# Patient Record
Sex: MALE | Race: WHITE | NOT HISPANIC OR LATINO | Employment: UNEMPLOYED | URBAN - METROPOLITAN AREA
[De-identification: names, ages, dates, MRNs, and addresses within clinical notes are randomized per-mention and may not be internally consistent; named-entity substitution may affect disease eponyms.]

---

## 2020-12-17 ENCOUNTER — HISTORICAL (OUTPATIENT)
Dept: ADMINISTRATIVE | Facility: HOSPITAL | Age: 30
End: 2020-12-17

## 2020-12-23 ENCOUNTER — HISTORICAL (OUTPATIENT)
Dept: ADMINISTRATIVE | Facility: HOSPITAL | Age: 30
End: 2020-12-23

## 2021-01-26 ENCOUNTER — HISTORICAL (OUTPATIENT)
Dept: ADMINISTRATIVE | Facility: HOSPITAL | Age: 31
End: 2021-01-26

## 2021-05-25 ENCOUNTER — HISTORICAL (OUTPATIENT)
Dept: ADMINISTRATIVE | Facility: HOSPITAL | Age: 31
End: 2021-05-25

## 2021-06-21 ENCOUNTER — HISTORICAL (OUTPATIENT)
Dept: ADMINISTRATIVE | Facility: HOSPITAL | Age: 31
End: 2021-06-21

## 2022-04-09 ENCOUNTER — HISTORICAL (OUTPATIENT)
Dept: ADMINISTRATIVE | Facility: HOSPITAL | Age: 32
End: 2022-04-09

## 2022-04-25 VITALS
BODY MASS INDEX: 35.2 KG/M2 | WEIGHT: 274.25 LBS | OXYGEN SATURATION: 96 % | SYSTOLIC BLOOD PRESSURE: 130 MMHG | DIASTOLIC BLOOD PRESSURE: 86 MMHG | HEIGHT: 74 IN

## 2022-05-02 NOTE — HISTORICAL [facility]
This is a historical note converted from Arnav. Formatting and pictures may have been removed.  Please reference Arnav for original formatting and attached multimedia. Chief Complaint  ORIF L acetabulum, posterior pelvic ring fx sx 11/28/20... pt states he is having alot of pain, no other changes, xr today  History of Present Illness  Patient comes in today follow-up for his left?pelvic fracture and for wound check.  Patient?and his caretaker?say they have been doing wound care but they have been putting antibiotic ointment on it and has not been totally Dry.  They state the redness has improved?but there is then?still an area of exudate in the wound is mildly?dehisced as as of last week.  ?  Patient denies any fevers?or purulent drainage today  Review of Systems  Constitutional: No fever, No chills.  Respiratory: No shortness of breath, No cough.  Cardiovascular: No chest pain.  Gastrointestinal: No nausea, No vomiting, No diarrhea, No constipation, No heartburn.  Genitourinary: No dysuria, No hematuria.  Hematology/Lymphatics: No bleeding tendency.  Endocrine: No polyuria.  Neurologic: Alert and oriented X4, No numbness, No tingling.  Psychiatric: No anxiety, No depression.  Integumentary: ?negative except as documented in history of present illness  Physical Exam  Vitals & Measurements  T:?36.4? ?C (Oral)? HR:?104(Peripheral)? BP:?136/88?  HT:?188.00?cm? WT:?108.000?kg? BMI:?30.56?  Patients left?pelvic?incision area does have area of dehiscence but there is very minimal to no erythema around the wound edges.  There is some fibrinous and mild exudate?on the more midline aspect of it.  The?more lateral aspect has?dried up?eschar.  No purulent drainage  ?  Patient has repeat pelvic x-rays today  No acute changes noted in the patients positioning or hardware?of his left pelvic fracture  ?  At this point will continue patient antibiotics for another week.  He is going to use some?peroxide?once daily to cleanse the  wound and apply dry dressing to it there afterwards. ?He is not to use any ointments or other types of?skin moistening agents. follow-up next week for repeat evaluation of his wound  I?will?discuss this with Dr. Weiss and if he wants to proceed in a different manner?we will contact the patient  Assessment/Plan  1.?Closed displaced transverse fracture of left acetabulum?S32.031Z  2.?Multiple fractures of pelvis with unstable disruption of pelvic [place]?S32.812F  Orders:  sulfamethoxazole-trimethoprim, 1 tab(s), Oral, BID, X 7 day(s), # 14 tab(s), 0 Refill(s), Pharmacy: [facility]/pharmacy #[id number], 188, cm, Height/Length Dosing, 12/23/20 13:17:00 CST, 108, kg, Weight Dosing, 12/23/20 13:17:00 CST   Problem List/Past Medical History  Ongoing  Obesity  Historical  HA - Headache  Tobacco abuse  Procedure/Surgical History  Incision and Drainage Hip (Left) (11/28/2020)  Removal of Drainage Device from Lower Extremity Subcutaneous Tissue and Fascia, Open Approach (11/28/2020)  ORIF Acetabulum/Pelvis (Left) (11/23/2020)  Reposition Left Acetabulum with Internal Fixation Device, Open Approach (11/23/2020)  Reposition Left Pelvic Bone with Internal Fixation Device, Open Approach (11/23/2020)  Denies   Medications  Bactrim  mg-160 mg oral tablet, 1 tab(s), Oral, BID  Enoxaparin 30mg Inj, 30 mg, Subcutaneous, q12hr  methocarbamol 750 mg oral tablet, 750 mg= 1 tab(s), Oral, TID  Neurontin 300 mg oral capsule, 300 mg= 1 cap(s), Oral, TID  Norco 10 mg-325 mg oral tablet, 1 tab(s), Oral, q6hr, PRN,? ?Not taking: Last Dose Date/Time Unknown  Allergies  No Known Medication Allergies  Social History  Abuse/Neglect  No, 12/23/2020  Alcohol - Denies Alcohol Use, 11/14/2014  Current, Beer, Daily, Alcohol use interferes with work or home: No. Others hurt by drinking: No. Household alcohol concerns: No., 11/22/2020  Home/Environment  Lives with INCARCERATED. Alcohol abuse in household: No. Substance abuse in household: No. Smoker in  household: No. Injuries/Abuse/Neglect in household: No., 04/30/2015  Substance Use - Denies Substance Abuse, 11/14/2014  Current, Marijuana, 11/22/2020  Tobacco  10 or more cigarettes (1/2 pack or more)/day in last 30 days, Cigarettes, No, 20 per day., 12/23/2020  Family History  Metastatic cancer: Father.  Immunizations  Vaccine Date Status Comments   influenza virus vaccine, inactivated - Not Given Patient Refuses     tetanus/diphtheria/pertussis, acel(Tdap) 11/21/2020 Given    Health Maintenance  Health Maintenance  ???Pending?(in the next year)  ??? ??OverDue  ??? ? ? ?Influenza Vaccine due??10/01/20??and every 1??day(s)  ??? ??Due In Future?  ??? ? ? ?Obesity Screening not due until??01/01/21??and every 1??year(s)  ??? ? ? ?Smoking Cessation not due until??01/01/21??and every 1??year(s)  ??? ? ? ?Alcohol Misuse Screening not due until??01/02/21??and every 1??year(s)  ??? ? ? ?ADL Screening not due until??04/28/21??and every 1??year(s)  ???Satisfied?(in the past 1 year)  ??? ??Satisfied?  ??? ? ? ?ADL Screening on??04/28/20.??Satisfied by Ace Enriquez LPN  ??? ? ? ?Alcohol Misuse Screening on??12/17/20.??Satisfied by Freddy Maya  ??? ? ? ?Blood Pressure Screening on??12/23/20.??Satisfied by Freddy Maya  ??? ? ? ?Body Mass Index Check on??12/23/20.??Satisfied by Freddy Maya  ??? ? ? ?Depression Screening on??12/23/20.??Satisfied by Freddy Maya  ??? ? ? ?Influenza Vaccine on??12/23/20.??Satisfied by Freddy Maya  ??? ? ? ?Obesity Screening on??12/23/20.??Satisfied by Freddy Maya  ??? ? ? ?Smoking Cessation on??12/23/20.??Satisfied by Freddy Maya  ??? ? ? ?Tetanus Vaccine on??11/21/20.??Satisfied by Sy Ochoa RN  ?

## 2022-05-02 NOTE — HISTORICAL [facility]
This is a historical note converted from Arnav. Formatting and pictures may have been removed.  Please reference Arnav for original formatting and attached multimedia. Chief Complaint  8 wk ORIF L acetabulum, posterior pelvic ring fx sx 11.28.20, pt is c/o L hip pain, states he cant sleep at night, also c/o L knee pain, no other changes, xr today  History of Present Illness  Patient is here today for follow-up evaluation 8 weeks status post open reduction internal fixation left acetabulum fracture?left?posterior pelvic ring. ?He states that his wound over his left iliac wing has improved. ?He has not had any fever or any drainage. ?They have been washing it with antibacterial soap and water as well as peroxide.? He has some soreness in his left hip as expected. ?He states he experiences popping when he stands up to walk to the bathroom.? The patient has been advised to be toe-touch weightbearing to the left lower extremity, but?he does admit to walking short distances in his house.? He continues to complain of left knee pain. He does not report any other issues or complaints today.  Review of Systems  otherwise negative  Physical Exam  Vitals & Measurements  T:?37.0? ?C (Oral)? HR:?96(Peripheral)? BP:?130/76?  HT:?188.00?cm? WT:?108.000?kg? BMI:?30.56?  General: Awake, alert, oriented. Patient in no acute distress. Well nourished and well perfused.  Musculoskeletal:?  Incision over the anterior pelvis is well-healed. ?Clean, dry, intact with no erythema, drainage, wound dehiscence.  Incision?to the left posterior pelvis?is well-healed. ?Clean, dry, intact with no erythema, drainage, wound dehiscence.  Incision over the left iliac wing is clean and free of any signs of acute infection. No erythema or drainage.  Left lower extremity:?No pain with gentle range of motion of the hip.?Compartments are soft and compressible with no apparent swelling.? No obvious swelling or effusion at the knee. Knee range of motion 0 to  130 degrees with mild discomfort. No varus/valgus instability. Negative lachman.? Able to do straight leg raise. ?Calf is supple and nontender without signs of DVT. Dorsi and plantar flexion intact. Brisk capillary refill distally. Sensation to light touch intact distally  ?  Assessment/Plan  Closed transverse fracture of both anterior and posterior columns of left acetabulum?S32.671F  Orders:  acetaminophen-HYDROcodone, 1 tab(s), Oral, TID, PRN PRN as needed for pain, # 21 tab(s), 0 Refill(s), Pharmacy: [facility]/pharmacy #[id number], 188, cm, Height/Length Dosing, 01/26/21 14:26:00 CST, 108, kg, Weight Dosing, 01/26/21 14:26:00 CST  ?  Pt. is doing well today 8 weeks out from open reduction internal fixation of left acetabulum and left posterior pelvic ring. His x-rays demonstrate stable alignment and interval bone healing today.? At this point, we will allow him to begin a progressive weightbearing protocol to the left lower extremity. ?He will start with?25% weightbearing for 1 week, then 50% weightbearing for 1 week, then 75% weightbearing for 1 week, then full weightbearing.? He has not been completely compliant with his weightbearing restrictions at home, and he was advised of the importance of these restrictions and he understands the risks associated with noncompliance?such as fracture displacement, hardware failure, and the need for future surgeries.? I have set up physical therapy to assist?with his weightbearing progression. ?We will also have?physical therapy work with his left knee?which continues to be painful. ?Left knee x-rays?and examination are benign.? We will reevaluate his left knee at his next visit and if he continues to have pain we can consider an injection versus an MRI.? The wound over his left iliac wing is healing?well and has no signs of infection.? He was advised to discontinue the use of peroxide and continue to cleanse with antibacterial soap and water and pat dry and leave open to air.??He  states that he has?cut down?on his nicotine use but he does continue to smoke cigarettes daily. ?We have again discussed the risks of cigarettes including?slow wound healing, nonunion, and other surgical complications. ?He needs to discontinue the use of all nicotine products.??He will return in 6 weeks for repeat x-rays and evaluation. ?All questions concerns were addressed. ?He and his wife understand agree with the plan of care.  ?  The above findings, diagnostics, and treatment plan were discussed with Dr. Weiss who is in agreement with the plan of care.?   Problem List/Past Medical History  Ongoing  Obesity  Historical  HA - Headache  Tobacco abuse  Procedure/Surgical History  Incision and Drainage Hip (Left) (11/28/2020)  Removal of Drainage Device from Lower Extremity Subcutaneous Tissue and Fascia, Open Approach (11/28/2020)  ORIF Acetabulum/Pelvis (Left) (11/23/2020)  Reposition Left Acetabulum with Internal Fixation Device, Open Approach (11/23/2020)  Reposition Left Pelvic Bone with Internal Fixation Device, Open Approach (11/23/2020)  Denies   Medications  Neurontin 300 mg oral capsule, 300 mg= 1 cap(s), Oral, TID  Norco 7.5 mg-325 mg oral tablet, 1 tab(s), Oral, TID, PRN  Allergies  No Known Medication Allergies  Social History  Abuse/Neglect  No, 01/26/2021  Alcohol - Denies Alcohol Use, 11/14/2014  Current, Beer, Daily, Alcohol use interferes with work or home: No. Others hurt by drinking: No. Household alcohol concerns: No., 11/22/2020  Home/Environment  Lives with INCARCERATED. Alcohol abuse in household: No. Substance abuse in household: No. Smoker in household: No. Injuries/Abuse/Neglect in household: No., 04/30/2015  Substance Use - Denies Substance Abuse, 11/14/2014  Current, Marijuana, 11/22/2020  Tobacco  10 or more cigarettes (1/2 pack or more)/day in last 30 days, Cigarettes, No, 20 per day., 01/26/2021  Family History  Metastatic cancer: Father.  Immunizations  Vaccine Date Status Comments    influenza virus vaccine, inactivated - Not Given Patient Refuses     tetanus/diphtheria/pertussis, acel(Tdap) 11/21/2020 Given    Health Maintenance  Health Maintenance  ???Pending?(in the next year)  ??? ??OverDue  ??? ? ? ?Influenza Vaccine due??10/01/20??and every 1??day(s)  ??? ??Due?  ??? ? ? ?Alcohol Misuse Screening due??01/02/21??and every 1??year(s)  ??? ??Refused?  ??? ? ? ?Smoking Cessation due??01/01/21??and every 1??year(s)  ??? ??Due In Future?  ??? ? ? ?ADL Screening not due until??04/28/21??and every 1??year(s)  ??? ? ? ?Obesity Screening not due until??01/01/22??and every 1??year(s)  ???Satisfied?(in the past 1 year)  ??? ??Satisfied?  ??? ? ? ?ADL Screening on??04/28/20.??Satisfied by Ace Enriquez LPN  ??? ? ? ?Alcohol Misuse Screening on??12/17/20.??Satisfied by Freddy Maya  ??? ? ? ?Blood Pressure Screening on??01/26/21.??Satisfied by Freddy Maya  ??? ? ? ?Body Mass Index Check on??01/26/21.??Satisfied by Freddy Maya  ??? ? ? ?Depression Screening on??01/26/21.??Satisfied by Freddy Maya  ??? ? ? ?Influenza Vaccine on??01/26/21.??Satisfied by Freddy Maya  ??? ? ? ?Obesity Screening on??01/26/21.??Satisfied by Freddy Maya  ??? ? ? ?Smoking Cessation on??12/23/20.??Satisfied by Freddy Maya  ??? ? ? ?Tetanus Vaccine on??11/21/20.??Satisfied by Sy Ochoa RN  ??? ??Refused?  ??? ? ? ?Smoking Cessation on??01/26/21.??Recorded by Freddy Maya  ?  Diagnostic Results  x-rays of the pelvis?demonstrate appropriate alignment with no displacement compared to previous films; hardware intact with no signs of hardware failure or loosening; interval bone healing noted; concentric hip reduction; femoral acetabular joint space maintained      Patient evaluated and discussed with Cheryl Donato NP. I agree with her assessment and plan of care with any exceptions or additions noted.

## 2022-05-02 NOTE — HISTORICAL [facility]
This is a historical note converted from Arnav. Formatting and pictures may have been removed.  Please reference Arnav for original formatting and attached multimedia. Chief Complaint  2.5 weeks hosp f/u postop pelvic sx 11/28/20... c/o sharp L knee pain.. says he gets bad spasms down left leg... no other changes  History of Present Illness  Patient is here today?for follow-up evaluation?3 and half weeks out from open reduction internal fixation of left?acetabulum and open reduction internal fixation of left?posterior pelvic ring injury.? He is 2 and half weeks out from intraoperative Hemovac drain removal.? He complains of left knee pain today.? This is a new complaint.? He has some soreness as expected to his pelvis but?pain is controlled?with medications.? He reports that?the incision?over his left hip?has had a small amount of drainage to the dressings.? His other incisions have remained clean and dry. ?He has not had any fever or chills.? He states that?he has been compliant with his nonweightbearing status to the?left leg for the most part and has been able to use his right leg to pivot and transfer. ?He does state that?he has?walk short distances?a few times.? Continues to use nicotine products. ?He does not report any other complaints or issues today.  Review of Systems  Otherwise negative  Physical Exam  Vitals & Measurements  T:?36.4? ?C (Oral)? HR:?98(Peripheral)? BP:?137/93?  HT:?188.00?cm? WT:?108.000?kg? BMI:?30.56?  General: Awake, alert, oriented. Patient in no acute distress. Well nourished and well perfused.  Musculoskeletal:?  Incision over the anterior pelvis is well-healed. ?Clean, dry, intact with no erythema, drainage, wound dehiscence.  Incision?to the left posterior pelvis is well-healed.? Clean, dry, intact with no erythema, drainage, wound dehiscence.  Incision over the left?iliac wing?has a superficial?dehiscence to the central portion of the wound with some fibrinous exudate.? There is  no?surrounding erythema. ?There is no purulence.? There is no deep?dehiscence.  Left lower extremity:?No pain with gentle range of motion of the hip.?compartments are soft and compressible with no apparent swelling.? He complains of pain over the?anterior knee.? Knee range of motion 0 to 130 degrees without pain.? No tenderness to palpation over the medial or lateral joint line. ?Negative Lachman. ?No varus or valgus instability.? Able to do straight leg raise. ?Calf is supple and nontender without signs of DVT. Dorsi and plantar flexion intact. Brisk capillary refill distally. Sensation to light touch intact distally.  Assessment/Plan  1.?Closed displaced transverse fracture of left acetabulum?S32.452A  Ordered:  Clinic Follow up, *Est. 12/24/20 3:00:00 CST, Order for future visit, Closed displaced transverse fracture of left acetabulum  Multiple fractures of pelvis with unstable disruption of pelvic [place], [facility]  Post-Op follow-up visit [id number] PC, Closed displaced transverse fracture of left acetabulum  Multiple fractures of pelvis with unstable disruption of pelvic [place], [facility], 12/17/20 10:34:00 CST  XR Pelvis Minimum 3 Views, Routine, *Est. 12/24/20 3:00:00 CST, None, Ambulatory, Rad Type, Order for future visit, Closed displaced transverse fracture of left acetabulum  Multiple fractures of pelvis with unstable disruption of pelvic [place], Not Scheduled, *Est. 12/24/20 3:...  ?  2.?Multiple fractures of pelvis with unstable disruption of pelvic [place]?S32.811A  Ordered:  Clinic Follow up, *Est. 12/24/20 3:00:00 CST, Order for future visit, Closed displaced transverse fracture of left acetabulum  Multiple fractures of pelvis with unstable disruption of pelvic [place], [facility]  Post-Op follow-up visit [id number] PC, Closed displaced transverse fracture of left acetabulum  Multiple fractures of pelvis with unstable disruption of pelvic [place], 12/17/20  10:34:00 CST  XR Pelvis Minimum 3 Views, Routine, *Est. 12/24/20 3:00:00 CST, None, Ambulatory, Rad Type, Order for future visit, Closed displaced transverse fracture of left acetabulum  Multiple fractures of pelvis with unstable disruption of pelvic [place], Not Scheduled, *Est. 12/24/20 3:...  ?  Orders:  sulfamethoxazole-trimethoprim, 1 tab(s), Oral, BID, X 7 day(s), # 14 tab(s), 0 Refill(s), Pharmacy: [facility]/pharmacy #[id number], 188, cm, Height/Length Dosing, 12/17/20 10:23:00 CST, 108, kg, Weight Dosing, 12/17/20 10:23:00 CST  XR Knee Left 1 or 2 Views, Stat, 12/17/20 10:38:00 CST, None, Ambulatory, Rad Type, Left knee pain, Not Scheduled, 12/17/20 10:38:00 CST  Patient is 3.5 ?weeks out from reduction internal fixation of left acetabulum and posterior pelvic ring injury.? His anterior pelvic incision and left?posterior?hip incision are well-healed and free of any signs of infection. ?Staples were removed.? He can cleanse them with antibacterial soap and water, pat dry, and leave open to air.? He does have a?superficial dehiscence over the left iliac wing incision with some fibrinous exudate.? The area was cleansed thoroughly with normal saline today. ?Betadine paint was applied. ?Staples were removed. ?A dry dressing was applied.? He was instructed to cleanse it?daily with antibacterial soap and water and apply dry bandage.? I will cover him with Bactrim DS?to prevent any infection from developing. ?He does not have any signs of acute infection today.? He has a new complaint of left knee pain.? His x-rays and exam are benign.? We will continue to monitor. ?He was advised to work on stretching exercises.? We will consider MRI in the future if his pain is persistent.? He will need to remain nonweightbearing to the left lower extremity. ?He can continue to use the right lower extremity to pivot and transfer but no hopping or ambulation. ?We have had a long discussion regarding the importance of compliance with his  weightbearing restrictions and he understands?the risk of complications?associated with?walking at this point.? He is using a borrowed wheelchair, and is requesting a prescription for?a wheelchair with elevated leg rests. ?This was provided today.? We discussed smoking cessation today. ?He understands risks of?poor wound healing, infection,?nonunion, and other surgical complications associated with nicotine use.? We will plan to see him back in 1 week for wound check. ?We will repeat x-rays of his pelvis at this visit.? All questions and concerns were addressed. ?The patient understands and agrees with our plan of care.  ?  The above findings, diagnostics, and treatment plan were discussed with Dr. Weiss who is in agreement with the plan of care.?  Referrals  Clinic Follow up, *Est. 12/24/20 3:00:00 CST, Order for future visit, Closed displaced transverse fracture of left acetabulum  Multiple fractures of pelvis with unstable disruption of pelvic [place], [facility]   Problem List/Past Medical History  Ongoing  Obesity  Historical  HA - Headache  Tobacco abuse  Procedure/Surgical History  Incision and Drainage Hip (Left) (11/28/2020)  Removal of Drainage Device from Lower Extremity Subcutaneous Tissue and Fascia, Open Approach (11/28/2020)  ORIF Acetabulum/Pelvis (Left) (11/23/2020)  Reposition Left Acetabulum with Internal Fixation Device, Open Approach (11/23/2020)  Reposition Left Pelvic Bone with Internal Fixation Device, Open Approach (11/23/2020)  Denies   Medications  Bactrim  mg-160 mg oral tablet, 1 tab(s), Oral, BID  Enoxaparin 30mg Inj, 30 mg, Subcutaneous, q12hr  methocarbamol 750 mg oral tablet, 750 mg= 1 tab(s), Oral, TID  Neurontin 300 mg oral capsule, 300 mg= 1 cap(s), Oral, TID  Norco 10 mg-325 mg oral tablet, 1 tab(s), Oral, q4hr, PRN  Allergies  No Known Medication Allergies  Social History  Abuse/Neglect  No, 12/17/2020  Alcohol - Denies Alcohol Use, 11/14/2014  Current, Beer, Daily,  Alcohol use interferes with work or home: No. Others hurt by drinking: No. Household alcohol concerns: No., 11/22/2020  Home/Environment  Lives with INCARCERATED. Alcohol abuse in household: No. Substance abuse in household: No. Smoker in household: No. Injuries/Abuse/Neglect in household: No., 04/30/2015  Substance Use - Denies Substance Abuse, 11/14/2014  Current, Marijuana, 11/22/2020  Tobacco  10 or more cigarettes (1/2 pack or more)/day in last 30 days, Cigarettes, No, 20 per day., 12/17/2020  Family History  Metastatic cancer: Father.  Immunizations  Vaccine Date Status Comments   influenza virus vaccine, inactivated - Not Given Patient Refuses     tetanus/diphtheria/pertussis, acel(Tdap) 11/21/2020 Given    Health Maintenance  Health Maintenance  ???Pending?(in the next year)  ??? ??OverDue  ??? ? ? ?Smoking Cessation due??01/01/20??and every 1??year(s)  ??? ? ? ?Influenza Vaccine due??10/01/20??and every 1??day(s)  ??? ??Due In Future?  ??? ? ? ?Obesity Screening not due until??01/01/21??and every 1??year(s)  ??? ? ? ?Alcohol Misuse Screening not due until??01/02/21??and every 1??year(s)  ??? ? ? ?ADL Screening not due until??04/28/21??and every 1??year(s)  ???Satisfied?(in the past 1 year)  ??? ??Satisfied?  ??? ? ? ?ADL Screening on??04/28/20.??Satisfied by Ace Enriquez LPN  ??? ? ? ?Alcohol Misuse Screening on??12/17/20.??Satisfied by Freddy Maya  ??? ? ? ?Blood Pressure Screening on??12/17/20.??Satisfied by Freddy Maya  ??? ? ? ?Body Mass Index Check on??12/17/20.??Satisfied by Freddy Maya  ??? ? ? ?Depression Screening on??12/17/20.??Satisfied by Freddy Maya  ??? ? ? ?Influenza Vaccine on??12/17/20.??Satisfied by Freddy Maya  ??? ? ? ?Obesity Screening on??12/17/20.??Satisfied by Freddy Maya  ??? ? ? ?Tetanus Vaccine on??11/21/20.??Satisfied by Sy Ochoa RN  ?  Diagnostic Results  AP and lateral x-rays of the left knee are  free from any acute fracture, dislocation, or other bony abnormality; joint spaces well-maintained      Patient evaluated and discussed with Cheryl Donato NP. I agree with her assessment and plan of care with any exceptions or additions noted. Will need to monitor lateral incision, continue local wound care as directed.
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
## 2022-08-09 ENCOUNTER — HOSPITAL ENCOUNTER (EMERGENCY)
Facility: HOSPITAL | Age: 32
Discharge: HOME | End: 2022-08-09
Attending: EMERGENCY MEDICINE
Payer: MEDICAID

## 2022-08-09 VITALS
HEART RATE: 85 BPM | RESPIRATION RATE: 20 BRPM | DIASTOLIC BLOOD PRESSURE: 90 MMHG | OXYGEN SATURATION: 100 % | TEMPERATURE: 98 F | SYSTOLIC BLOOD PRESSURE: 138 MMHG

## 2022-08-09 DIAGNOSIS — L72.9 SCALP CYST: Primary | ICD-10-CM

## 2022-08-09 PROCEDURE — 25000003 PHARM REV CODE 250

## 2022-08-09 PROCEDURE — 10060 I&D ABSCESS SIMPLE/SINGLE: CPT

## 2022-08-09 PROCEDURE — 99283 EMERGENCY DEPT VISIT LOW MDM: CPT | Mod: 25

## 2022-08-09 RX ORDER — LIDOCAINE HYDROCHLORIDE 20 MG/ML
INJECTION, SOLUTION EPIDURAL; INFILTRATION; INTRACAUDAL; PERINEURAL
Status: COMPLETED
Start: 2022-08-09 | End: 2022-08-09

## 2022-08-09 RX ORDER — SULFAMETHOXAZOLE AND TRIMETHOPRIM 800; 160 MG/1; MG/1
1 TABLET ORAL 2 TIMES DAILY
Qty: 14 TABLET | Refills: 0 | Status: SHIPPED | OUTPATIENT
Start: 2022-08-09 | End: 2022-08-16

## 2022-08-09 RX ADMIN — LIDOCAINE HYDROCHLORIDE: 20 INJECTION, SOLUTION EPIDURAL; INFILTRATION; INTRACAUDAL; PERINEURAL at 08:08

## 2022-08-09 NOTE — FIRST PROVIDER EVALUATION
Medical screening exam completed.  I have conducted a focused provider triage encounter, findings are as follows:    Brief history of present illness:  31 year old male presents to ED for posterior head pain with worsening pain/swelling; patient reports knot has been there for about 2 years; Family member attempted to drain with needle without relief; denies fever, drainage      Vitals:    08/09/22 1856   BP: (!) 138/90   Pulse: 87   Resp: 18   Temp: 97.9 °F (36.6 °C)   SpO2: 96%       Pertinent physical exam:  Awake, alert; cyst noted to posterior head with erythema and tenderness to palpation     Brief workup plan:  I&D     Preliminary workup initiated; this workup will be continued and followed by the physician or advanced practice provider that is assigned to the patient when roomed.

## 2022-08-10 NOTE — ED PROVIDER NOTES
"Encounter Date: 8/9/2022       History     Chief Complaint   Patient presents with    Abscess     Pt. C/o "Knot to the back of my head" reports noticed has gotten worse over the past 2 years.. reports recently it has become painful to sleep on.. and significant other reports pt. Felt hot earlier. And also verbalized she stuck pt. With a needle the other day and nothing came out.. .     30 y/o male presents with posterior scalp cyst for couple months now that just became red, swollen, tender.     The history is provided by the patient. No  was used.   Abscess   This is a recurrent problem. The problem occurs occasionally. The abscess is present on the scalp. The pain is at a severity of 3/10. The abscess is characterized by redness and painfulness.     Review of patient's allergies indicates:  No Known Allergies  No past medical history on file.  No past surgical history on file.  No family history on file.     Review of Systems   Skin:        Posterior scalp cyst that is now infected   All other systems reviewed and are negative.      Physical Exam     Initial Vitals [08/09/22 1856]   BP Pulse Resp Temp SpO2   (!) 138/90 87 18 97.9 °F (36.6 °C) 96 %      MAP       --         Physical Exam    Nursing note and vitals reviewed.  Constitutional: He appears well-developed and well-nourished.   Pulmonary/Chest: No respiratory distress.     Neurological: He is alert and oriented to person, place, and time.   Skin: Skin is warm and dry.        Psychiatric: He has a normal mood and affect.         ED Course   I & D - Incision and Drainage    Date/Time: 8/9/2022 9:04 PM  Location procedure was performed: [facility] EMERGENCY DEPARTMENT  Performed by: TESS Quijano  Authorized by: TESS Quijano   Type: cyst  Body area: head/neck  Location details: scalp  Anesthesia: local infiltration    Anesthesia:  Local Anesthetic: lidocaine 1% without epinephrine    Patient sedated: no  Scalpel size: 11  Incision " type: single straight  Complexity: simple  Drainage: pus  Drainage amount: moderate  Wound treatment: incision and  wound left open  Patient tolerance: Patient tolerated the procedure well with no immediate complications        Labs Reviewed - No data to display       Imaging Results    None          Medications   LIDOcaine (PF) 20 mg/mL (2%) 20 mg/mL (2 %) injection (has no administration in time range)        Additional MDM:   Differential Diagnosis:   Other: The following diagnoses were also considered and will be evaluated: abscess, cyst.                    Clinical Impression:   Final diagnoses:  [L72.9] Scalp cyst (Primary)          ED Disposition Condition    Discharge Stable        ED Prescriptions     Medication Sig Dispense Start Date End Date Auth. Provider    sulfamethoxazole-trimethoprim 800-160mg (BACTRIM DS) 800-160 mg Tab Take 1 tablet by mouth 2 (two) times daily. for 7 days 14 tablet 8/9/2022 8/16/2022 TESS Quijano        Follow-up Information     Follow up With Specialties Details Why Contact Info    primary care provider  Call in 1 week As needed, If symptoms worsen            TESS Quijano  08/09/22 [id number]

## 2022-08-10 NOTE — ED NOTES
Pt ambulated to ED with c/o abcess to back of head x 2years that has increased in size recently. Is painful to touch